# Patient Record
Sex: MALE | Race: OTHER | NOT HISPANIC OR LATINO | URBAN - METROPOLITAN AREA
[De-identification: names, ages, dates, MRNs, and addresses within clinical notes are randomized per-mention and may not be internally consistent; named-entity substitution may affect disease eponyms.]

---

## 2018-03-09 ENCOUNTER — EMERGENCY (EMERGENCY)
Facility: HOSPITAL | Age: 38
LOS: 1 days | Discharge: HOME | End: 2018-03-09
Attending: EMERGENCY MEDICINE | Admitting: EMERGENCY MEDICINE
Payer: COMMERCIAL

## 2018-03-09 VITALS
SYSTOLIC BLOOD PRESSURE: 112 MMHG | DIASTOLIC BLOOD PRESSURE: 71 MMHG | WEIGHT: 199.96 LBS | OXYGEN SATURATION: 95 % | RESPIRATION RATE: 18 BRPM | TEMPERATURE: 100 F | HEIGHT: 66 IN | HEART RATE: 114 BPM

## 2018-03-09 VITALS
SYSTOLIC BLOOD PRESSURE: 121 MMHG | HEART RATE: 112 BPM | TEMPERATURE: 100 F | RESPIRATION RATE: 18 BRPM | DIASTOLIC BLOOD PRESSURE: 74 MMHG | OXYGEN SATURATION: 99 %

## 2018-03-09 DIAGNOSIS — R50.9 FEVER, UNSPECIFIED: ICD-10-CM

## 2018-03-09 DIAGNOSIS — J02.0 STREPTOCOCCAL PHARYNGITIS: ICD-10-CM

## 2018-03-09 LAB
ALBUMIN SERPL ELPH-MCNC: 5 G/DL — SIGNIFICANT CHANGE UP (ref 3.3–5)
ALP SERPL-CCNC: 68 U/L — SIGNIFICANT CHANGE UP (ref 40–120)
ALT FLD-CCNC: 51 U/L — HIGH (ref 10–45)
ANION GAP SERPL CALC-SCNC: 16 MMOL/L — SIGNIFICANT CHANGE UP (ref 5–17)
AST SERPL-CCNC: 29 U/L — SIGNIFICANT CHANGE UP (ref 10–40)
BASOPHILS NFR BLD AUTO: 0.1 % — SIGNIFICANT CHANGE UP (ref 0–2)
BILIRUB SERPL-MCNC: 0.8 MG/DL — SIGNIFICANT CHANGE UP (ref 0.2–1.2)
BUN SERPL-MCNC: 11 MG/DL — SIGNIFICANT CHANGE UP (ref 7–23)
CALCIUM SERPL-MCNC: 9.6 MG/DL — SIGNIFICANT CHANGE UP (ref 8.4–10.5)
CHLORIDE SERPL-SCNC: 94 MMOL/L — LOW (ref 96–108)
CO2 SERPL-SCNC: 25 MMOL/L — SIGNIFICANT CHANGE UP (ref 22–31)
CREAT SERPL-MCNC: 1.14 MG/DL — SIGNIFICANT CHANGE UP (ref 0.5–1.3)
GLUCOSE SERPL-MCNC: 131 MG/DL — HIGH (ref 70–99)
HCT VFR BLD CALC: 43.8 % — SIGNIFICANT CHANGE UP (ref 39–50)
HGB BLD-MCNC: 14.7 G/DL — SIGNIFICANT CHANGE UP (ref 13–17)
LYMPHOCYTES # BLD AUTO: 4.6 % — LOW (ref 13–44)
MCHC RBC-ENTMCNC: 27.7 PG — SIGNIFICANT CHANGE UP (ref 27–34)
MCHC RBC-ENTMCNC: 33.6 G/DL — SIGNIFICANT CHANGE UP (ref 32–36)
MCV RBC AUTO: 82.5 FL — SIGNIFICANT CHANGE UP (ref 80–100)
MONOCYTES NFR BLD AUTO: 4.5 % — SIGNIFICANT CHANGE UP (ref 2–14)
NEUTROPHILS NFR BLD AUTO: 90.8 % — HIGH (ref 43–77)
PLATELET # BLD AUTO: 263 K/UL — SIGNIFICANT CHANGE UP (ref 150–400)
POTASSIUM SERPL-MCNC: 3.8 MMOL/L — SIGNIFICANT CHANGE UP (ref 3.5–5.3)
POTASSIUM SERPL-SCNC: 3.8 MMOL/L — SIGNIFICANT CHANGE UP (ref 3.5–5.3)
PROT SERPL-MCNC: 8.8 G/DL — HIGH (ref 6–8.3)
RAPID RVP RESULT: SIGNIFICANT CHANGE UP
RBC # BLD: 5.31 M/UL — SIGNIFICANT CHANGE UP (ref 4.2–5.8)
RBC # FLD: 13 % — SIGNIFICANT CHANGE UP (ref 10.3–16.9)
S PYO AG SPEC QL IA: POSITIVE
SODIUM SERPL-SCNC: 135 MMOL/L — SIGNIFICANT CHANGE UP (ref 135–145)
WBC # BLD: 17.7 K/UL — HIGH (ref 3.8–10.5)
WBC # FLD AUTO: 17.7 K/UL — HIGH (ref 3.8–10.5)

## 2018-03-09 PROCEDURE — 87798 DETECT AGENT NOS DNA AMP: CPT

## 2018-03-09 PROCEDURE — 96374 THER/PROPH/DIAG INJ IV PUSH: CPT

## 2018-03-09 PROCEDURE — 99284 EMERGENCY DEPT VISIT MOD MDM: CPT

## 2018-03-09 PROCEDURE — 99284 EMERGENCY DEPT VISIT MOD MDM: CPT | Mod: 25

## 2018-03-09 PROCEDURE — 80053 COMPREHEN METABOLIC PANEL: CPT

## 2018-03-09 PROCEDURE — 36415 COLL VENOUS BLD VENIPUNCTURE: CPT

## 2018-03-09 PROCEDURE — 87581 M.PNEUMON DNA AMP PROBE: CPT

## 2018-03-09 PROCEDURE — 87880 STREP A ASSAY W/OPTIC: CPT

## 2018-03-09 PROCEDURE — 87633 RESP VIRUS 12-25 TARGETS: CPT

## 2018-03-09 PROCEDURE — 85025 COMPLETE CBC W/AUTO DIFF WBC: CPT

## 2018-03-09 PROCEDURE — 87486 CHLMYD PNEUM DNA AMP PROBE: CPT

## 2018-03-09 PROCEDURE — 96372 THER/PROPH/DIAG INJ SC/IM: CPT | Mod: XU

## 2018-03-09 RX ORDER — SODIUM CHLORIDE 9 MG/ML
1000 INJECTION INTRAMUSCULAR; INTRAVENOUS; SUBCUTANEOUS ONCE
Qty: 0 | Refills: 0 | Status: COMPLETED | OUTPATIENT
Start: 2018-03-09 | End: 2018-03-09

## 2018-03-09 RX ORDER — IBUPROFEN 200 MG
600 TABLET ORAL ONCE
Qty: 0 | Refills: 0 | Status: COMPLETED | OUTPATIENT
Start: 2018-03-09 | End: 2018-03-09

## 2018-03-09 RX ORDER — PENICILLIN G BENZATHINE 1200000 [IU]/2ML
1.2 INJECTION, SUSPENSION INTRAMUSCULAR ONCE
Qty: 0 | Refills: 0 | Status: DISCONTINUED | OUTPATIENT
Start: 2018-03-09 | End: 2018-03-09

## 2018-03-09 RX ORDER — ONDANSETRON 8 MG/1
4 TABLET, FILM COATED ORAL ONCE
Qty: 0 | Refills: 0 | Status: COMPLETED | OUTPATIENT
Start: 2018-03-09 | End: 2018-03-09

## 2018-03-09 RX ORDER — PENICILLIN G BENZATHINE 1200000 [IU]/2ML
2.4 INJECTION, SUSPENSION INTRAMUSCULAR ONCE
Qty: 0 | Refills: 0 | Status: COMPLETED | OUTPATIENT
Start: 2018-03-09 | End: 2018-03-09

## 2018-03-09 RX ADMIN — Medication 600 MILLIGRAM(S): at 18:36

## 2018-03-09 RX ADMIN — PENICILLIN G BENZATHINE 2.4 MILLION UNIT(S): 1200000 INJECTION, SUSPENSION INTRAMUSCULAR at 20:05

## 2018-03-09 RX ADMIN — ONDANSETRON 4 MILLIGRAM(S): 8 TABLET, FILM COATED ORAL at 18:36

## 2018-03-09 RX ADMIN — SODIUM CHLORIDE 1000 MILLILITER(S): 9 INJECTION INTRAMUSCULAR; INTRAVENOUS; SUBCUTANEOUS at 18:35

## 2018-03-09 NOTE — ED ADULT TRIAGE NOTE - CHIEF COMPLAINT QUOTE
Pt directed to ED from [facility].  Pt states "I was swabbed for strep and the flu but they were both negative."  PT also CO associated N/V.  Pt denies diarrhea, SOB, CP and Dizziness

## 2018-03-09 NOTE — ED PROVIDER NOTE - ATTENDING CONTRIBUTION TO CARE
Pt w/ PMHx depression, OCD p/w 1 day of fever, chills, myalgias, n/v x 2 last night, x 2 today. No cough. No urinary sx. No abdominal pain. No diarrhea. Pt took Tylenol last night. Pt went to [facility] today, swabbed throat and flu neg  tonsillar erythema, edema Pt w/ PMHx depression, OCD p/w 1 day of fever, chills, myalgias, n/v x 2 last night, x 2 today. No cough. No urinary sx. No abdominal pain. No diarrhea. + multiple episodes of vomiting earlier, resolved. + sore throat. No Cp, SOB. No neck pain / stiffness. +mild HA. Pt took Tylenol last night. [place] [facility] [place] and flu neg  VITAL SIGNS: I have reviewed nursing notes and confirm.  CONSTITUTIONAL: Well-developed; well-nourished; in no acute distress. non toxic appearing  SKIN: warm and dry, no acute rash.  HEAD: Normocephalic; atraumatic.  EYES: conjunctiva and sclera clear.  ENT: No nasal discharge; airway clear. + erythema and tonsillar enlargement. no exudates. uvula midline. no peritonsillar swelling. normal phonation. no drooling or stridor  NECK: Supple  CARD: S1, S2 normal; no murmurs, gallops, or rubs. Regular rate and rhythm.  RESP: Breaths sounds equal and clear b/l. No increased WOB, tachypnea, hypoxia, or accessory mm use. Pt speaks in full sentences.   ABD: Normal bowel sounds; soft; non-distended; non-tender; no hepatosplenomegaly. no pulsatile abd masses  EXT: Normal ROM. No clubbing, cyanosis or edema.  NEURO: Alert, oriented. Grossly unremarkable.  PSYCH: Cooperative, appropriate.   Pt p/w n/v, myalgias, sore throat, HA. Well-appearing, non toxic. Benign, nontender abd. Clear lungs. IVF, antiemetics w/ relief of sx. STrep A+. Pt given Bicillin. Pt feels better after tx. Will d/c w/ careful instruction to RTC worsening sx

## 2018-03-09 NOTE — ED PROVIDER NOTE - CONSTITUTIONAL, MLM
normal... Nourished, awake, alert, oriented to person, place, time/situation and in no apparent distress.

## 2018-03-09 NOTE — ED PROVIDER NOTE - MEDICAL DECISION MAKING DETAILS
39 y/o male with fever, chills, body-aches, sore throat, nausea/vomiting. HR elevated at 114. Currently afebrile. Throat with b/l tonsilar swelling with redness, no exudates. Lungs clear. RVP/strep and labs pend. 37 y/o male with fever, chills, body-aches, sore throat, nausea/vomiting. HR elevated at 114. Continue to monitor. Denies chest pain. Currently afebrile. Throat with b/l tonsilar swelling with redness, no exudates. Lungs clear. RVP pend. Labs with elevated wbc - possible stress response from vomiting. Strep + and will treat with bicillin and monitor. Pt will be called for RVP. Pt agrees with plan and is safe for dc.

## 2018-03-09 NOTE — ED ADULT NURSE NOTE - CHPI ED SYMPTOMS NEG
no chills/no decreased eating/drinking/no diarrhea/no shortness of breath/no rash/no headache/no abdominal pain/no cough

## 2018-03-09 NOTE — ED ADULT NURSE NOTE - OBJECTIVE STATEMENT
Pt w/ PMH of OCD and depression presents to ED today on referral of UC c/o fever.  Pt's highest recorded fever was 101.4  Pt was recently a visitor at a hospital w/ a relative suffering from "some blood-borne illness."  Pt also c/o intermittent N/V.  Pt denies CP, SOB, cough, or dizziness.  Pt is pending lab work.

## 2018-03-09 NOTE — ED PROVIDER NOTE - OBJECTIVE STATEMENT
39 y/o male with a PMHx of depression and OCD is present with fever, chills, body-aches and sore throat x1 day. Pt reports his symptoms started yesterday after visiting his father in law in the hospital last night. Pt reports he felt feverish and had 1 episode of vomiting last night in which he noticed some blood (however as per pt, vomiting blood in the past is his normal). Pt reports he has 2 additional episodes of vomiting today with no blood. Pt reports body-aches all over. Took tylenol without alleviation of symptoms. Pt went to [facility] who swabbed his nose and throat and were negative. Pt was referred to the ED for further work-up to determine cause of fever. Pt denies the following: abdominal pain, sob, chest pain, cough, runny nose, urinary symptoms, recent dental work, IV drug use. Pt reports flu vaccination up to date.
